# Patient Record
(demographics unavailable — no encounter records)

---

## 2025-07-25 NOTE — DISCUSSION/SUMMARY
[de-identified] : Discussed the nature of the diagnosis and risk and benefits of different modalities of treatment. LT CTS LT Ulno Carpal abutment  X rays reviewed & discussed He will splint at night  Rx provided He will take MDP Rx provided RTO 4 weeks

## 2025-07-25 NOTE — PHYSICAL EXAM
[Dorsal Wrist] : dorsal wrist [Anatomic Snuff Box] : anatomic snuff box [Left] : left wrist [] : negative Phalen's [FreeTextEntry3] : trace dorsal wrist swelling [de-identified] : 1st CMC & STT nontender [FreeTextEntry9] : ROM equal B/L [FreeTextEntry8] : Ulnar positive variance with trace signs of ulno carpal abutment

## 2025-07-25 NOTE — HISTORY OF PRESENT ILLNESS
[4] : 4 [Dull/Aching] : dull/aching [Throbbing] : throbbing [Constant] : constant [Household chores] : household chores [Leisure] : leisure [de-identified] : 58 year old male with left hand pain present for one month. Reports pain began after picking up a garbage can. C/o stiffness, sensation of swelling. No known injury. [] : no [FreeTextEntry1] : Left hand [FreeTextEntry5] : 1 mo he was picking up a garbage can, felt a lot of pain in his hand hx of stent placement